# Patient Record
Sex: FEMALE | Race: WHITE | NOT HISPANIC OR LATINO | URBAN - METROPOLITAN AREA
[De-identification: names, ages, dates, MRNs, and addresses within clinical notes are randomized per-mention and may not be internally consistent; named-entity substitution may affect disease eponyms.]

---

## 2017-09-05 ENCOUNTER — HISTORICAL (OUTPATIENT)
Dept: ADMINISTRATIVE | Facility: HOSPITAL | Age: 47
End: 2017-09-05

## 2018-05-29 ENCOUNTER — HISTORICAL (OUTPATIENT)
Dept: ADMINISTRATIVE | Facility: HOSPITAL | Age: 48
End: 2018-05-29

## 2018-07-31 ENCOUNTER — HISTORICAL (OUTPATIENT)
Dept: ADMINISTRATIVE | Facility: HOSPITAL | Age: 48
End: 2018-07-31

## 2019-12-13 PROBLEM — K91.89 POSTCHOLECYSTECTOMY DIARRHEA: Status: ACTIVE | Noted: 2017-07-09

## 2019-12-13 PROBLEM — H40.039 ANATOMICAL NARROW ANGLE GLAUCOMA: Status: ACTIVE | Noted: 2019-12-13

## 2019-12-13 PROBLEM — K58.0 IRRITABLE BOWEL SYNDROME WITH DIARRHEA: Status: ACTIVE | Noted: 2017-07-09

## 2019-12-13 PROBLEM — R19.7 POSTCHOLECYSTECTOMY DIARRHEA: Status: ACTIVE | Noted: 2017-07-09

## 2022-04-30 NOTE — OP NOTE
DATE OF SURGERY:9-5-17    SURGEON:  Evy Guevara MD    PREOPERATIVE DIAGNOSIS: Open angle glaucoma right eye    POSTOPERATIVE DIAGNOSIS:  Same status post procedure.    OPERATION: Trabeculectomy with right eye    Anesthesia:  General IV.  Complications:  None.  Estimated blood loss:  Less than 1 cc.    INDICATION FOR PROCEDURE:  The patient was evaluated in clinic and noted to have elevated intraocular pressure and progression of visual field despite maximum medical therapy.  Options were discussed with the patient and family and patient elected forsurgical intervention.  Risks, benefits and alternatives were explained.  Informed consent was obtained from the patient in clinic.    PROCEDURE IN DETAIL:  The patient was brought into the OR and laid in supine position.  General IV anesthesia was undertaken without complication.  The lids and the eye were prepped in usual manner for intraocular surgery.  A wire lid speculum was placed in the operative eye for adequate exposure to the surgical site.      A 7-0 Vicryl suture was placed in the cornea superiorly and the eye moved inferiorly for exposure to the surgical site.  A small peritomy was made superiorly and a mixture of preservative free lidocaine and epinephrine was injected into the sub Tenon's space for further anesthesia.  The incision was then continued nasally for approximately 5 mm.  The conjunctiva was undermined posteriorly with blunt dissection.  Wet-Field cautery was used for hemostasis.  A 4 x 3 mm partial thickness flap was made in the sclera using a crescent blade.  A paracentesis was made temporally and a small amount of Healon was injected into the anterior chamber.  A paracentesis blade was used to make a tract into the AC underneath the flap. A sclerotomy was made with the use of a Kely punch. . The flap was secured with 4 10-0 nylon sutures. The AC was reinflated with BSS in stable form.  There was a small amount of fluid leakage from  the wound which was ideal for this surgery.  The conjunctiva was closed with interrupted and mattress 10-0 nylon suture.  BSS was injected AC and the fluid was noted to efflux into the conjunctiva.  The IOP was judged to be physiologic.  0.1 cc of MMC (0.4 mg/ml) was injected under the conunctiva.The wounds were watertight.  Postop injections were given and wire lid speculum was removed, Maxitrolointment applied to the operative eye followed by a     pressure patch and shield.  The patient left the operating room in stable condition, having tolerated the surgery well.

## 2022-04-30 NOTE — OP NOTE
DATE OF SURGERY:    07/31/2018    SURGEON:  Evy Guevara MD    PREOPERATIVE DIAGNOSIS:  Hypotony of the right eye.    POSTOPERATIVE DIAGNOSIS:  Hypotony of the right eye, status post procedure.    PROCEDURE PERFORMED:  Bleb revision of the right eye.    COMPLICATIONS:  None.    ESTIMATED BLOOD LOSS:  Less than 1 cc.    ANESTHESIA:  Mask.    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  However, status post surgery with retina, patient has had hypotony, has been unresponsive to Healon injection and attempts to elevate the intra-ocular pressure.  Advantages, risks, and benefits of surgical intervention were discussed with the patient in the office, and informed consent was obtained prior to surgery.    DESCRIPTION OF PROCEDURE:  The patient was brought into the operating room and laid in supine position.  Anesthesia was undertaken without complication.  The operative eye was prepped and draped in the usual manner for intraocular surgery.  A wire lid speculum was placed in the operative eye for adequate exposure to the surgical site.  A 7-0 Vicryl suture was placed in the cornea superiorly and the eye moved inferiorly for adequate exposure to the surgical site.  A small peritomy was made on either side of the previous trabeculectomy bleb, and a mixture of lidocaine and Marcaine was injected subconjunctivally for further anesthesia and dissection of the conjunctiva.  The peritomy was continued using blunt tip Daya scissors.  The scleral flap was found, and 10-0 nylon suture was used to secure the flap to the sclera and was noted to be watertight.  The conjunctiva was then reapproximated to the globe using Vicryl suture.  A small amount Healon was injected into the anterior chamber.  Intra-ocular pressure was  determined to be physiologic.  Traction suture was removed.  Postop injections given.  The patient was cleaned in wet-to-dry     fashion.  Maxitrol ointment was placed on the operative eye, and a pressure patch and shield were placed.  The patient was turned over to Anesthesia in stable condition.        ______________________________  MD PITO Hartman/DAMIEN  DD:  07/31/2018  Time:  02:47PM  DT:  07/31/2018  Time:  03:02PM  Job #:  [id number]

## 2022-04-30 NOTE — OP NOTE
DATE OF SURGERY:    5-29-18    SURGEON:  Evy Guevara MD    PREOPERATIVE DIAGNOSIS:  Nucleosclerotic cataract of the right eye.    POST OPERATIVE DIAGNOSIS:  Nucleosclerotic cataract of the right eye.    PROCEDURE:  Complex Cataract extraction with intraocular lens implantation of the right eye with iris hooks    ANESTHESIA:  Local plus MAC.    COMPLICATIONS:  None.    ESTIMATED BLOOD LOSS:  None.      After discussion of risks, benefits and alternative with the patient and family, informed consent was obtained by the patient in clinic including but not limited to bleeding, infection, decreased vision, loss of the eye, need for subsequent surgery.  The patient understands and wishes to proceed.    PROCEDURE IN DETAIL:    The patient was brought into the operating room and laid in supine manner.  After anesthesia was undertaken without complication, the right eye and periorbital region were prepped and draped in usual manner for intraocular surgery while a speculum was placed in the operative eye for adequate exposure. A paracentesis incision was made at approximately 11 o'clock with a clear cornea at the limbus. Preservative free Lidocaine and epinephrine was injected into the anterior chamber followed by viscoelastic.  Healon and a sinsky hook were used to break adhesions bewteen the lens and iris. Iris hooks wer einserted for adequate mydriasis. A triplanar cataract wound was then created approximately 7 o'clock through clear cornea at the limbus.  Curvilinear capsulorrhexis was created without complication.     BSS on a cannula was used to hydrodissect the lens.  The lens was found to move freely within the capsular bag.  Lens was then removed in divide and conquer technique.  Remaining cortical material was removed with I&A handpiece. A 21.5 diopter ZCBOO lens was then implanted into the capsular bag. The remaining viscoelastic was then removed with the irrigation aspiration handpiece. The wounds were  hydrated and 10-0 nylon suture was     placed at the cataract wound.  Postop injections given. The patient tolerated the procedure well.  Eyelid speculum was removed followed by pressure patch and shield.  The patient was turned over to anesthesia in stable condition.